# Patient Record
Sex: MALE | Race: WHITE | Employment: FULL TIME | URBAN - METROPOLITAN AREA
[De-identification: names, ages, dates, MRNs, and addresses within clinical notes are randomized per-mention and may not be internally consistent; named-entity substitution may affect disease eponyms.]

---

## 2024-02-24 ENCOUNTER — HOSPITAL ENCOUNTER (OUTPATIENT)
Facility: HOSPITAL | Age: 39
Setting detail: OBSERVATION
Discharge: HOME | End: 2024-02-25
Attending: EMERGENCY MEDICINE | Admitting: INTERNAL MEDICINE

## 2024-02-24 DIAGNOSIS — S01.511A LIP LACERATION: Primary | ICD-10-CM

## 2024-02-24 DIAGNOSIS — S01.511A LIP LACERATION, INITIAL ENCOUNTER: Primary | ICD-10-CM

## 2024-02-24 LAB
ANION GAP SERPL CALC-SCNC: 7 MMOL/L (ref 0–18)
APTT PPP: 30.8 SECONDS (ref 23.3–35.6)
BASOPHILS # BLD AUTO: 0.06 X10(3) UL (ref 0–0.2)
BASOPHILS NFR BLD AUTO: 0.6 %
BUN BLD-MCNC: 16 MG/DL (ref 9–23)
BUN/CREAT SERPL: 15.7 (ref 10–20)
CALCIUM BLD-MCNC: 9.4 MG/DL (ref 8.7–10.4)
CHLORIDE SERPL-SCNC: 106 MMOL/L (ref 98–112)
CO2 SERPL-SCNC: 27 MMOL/L (ref 21–32)
CREAT BLD-MCNC: 1.02 MG/DL
DEPRECATED RDW RBC AUTO: 43.5 FL (ref 35.1–46.3)
EGFRCR SERPLBLD CKD-EPI 2021: 96 ML/MIN/1.73M2 (ref 60–?)
EOSINOPHIL # BLD AUTO: 0.3 X10(3) UL (ref 0–0.7)
EOSINOPHIL NFR BLD AUTO: 3 %
ERYTHROCYTE [DISTWIDTH] IN BLOOD BY AUTOMATED COUNT: 12.9 % (ref 11–15)
GLUCOSE BLD-MCNC: 97 MG/DL (ref 70–99)
HCT VFR BLD AUTO: 47.4 %
HGB BLD-MCNC: 16 G/DL
IMM GRANULOCYTES # BLD AUTO: 0.05 X10(3) UL (ref 0–1)
IMM GRANULOCYTES NFR BLD: 0.5 %
INR BLD: 0.93 (ref 0.8–1.2)
LYMPHOCYTES # BLD AUTO: 2.7 X10(3) UL (ref 1–4)
LYMPHOCYTES NFR BLD AUTO: 27.1 %
MCH RBC QN AUTO: 30.7 PG (ref 26–34)
MCHC RBC AUTO-ENTMCNC: 33.8 G/DL (ref 31–37)
MCV RBC AUTO: 91 FL
MONOCYTES # BLD AUTO: 0.71 X10(3) UL (ref 0.1–1)
MONOCYTES NFR BLD AUTO: 7.1 %
NEUTROPHILS # BLD AUTO: 6.13 X10 (3) UL (ref 1.5–7.7)
NEUTROPHILS # BLD AUTO: 6.13 X10(3) UL (ref 1.5–7.7)
NEUTROPHILS NFR BLD AUTO: 61.7 %
OSMOLALITY SERPL CALC.SUM OF ELEC: 291 MOSM/KG (ref 275–295)
PLATELET # BLD AUTO: 249 10(3)UL (ref 150–450)
POTASSIUM SERPL-SCNC: 3.7 MMOL/L (ref 3.5–5.1)
PROTHROMBIN TIME: 13 SECONDS (ref 11.6–14.8)
RBC # BLD AUTO: 5.21 X10(6)UL
SODIUM SERPL-SCNC: 140 MMOL/L (ref 136–145)
WBC # BLD AUTO: 10 X10(3) UL (ref 4–11)

## 2024-02-24 PROCEDURE — 83036 HEMOGLOBIN GLYCOSYLATED A1C: CPT | Performed by: INTERNAL MEDICINE

## 2024-02-24 PROCEDURE — 80048 BASIC METABOLIC PNL TOTAL CA: CPT | Performed by: EMERGENCY MEDICINE

## 2024-02-24 PROCEDURE — 99285 EMERGENCY DEPT VISIT HI MDM: CPT

## 2024-02-24 PROCEDURE — 90471 IMMUNIZATION ADMIN: CPT

## 2024-02-24 PROCEDURE — 85025 COMPLETE CBC W/AUTO DIFF WBC: CPT | Performed by: EMERGENCY MEDICINE

## 2024-02-24 PROCEDURE — 96375 TX/PRO/DX INJ NEW DRUG ADDON: CPT

## 2024-02-24 PROCEDURE — 85610 PROTHROMBIN TIME: CPT | Performed by: EMERGENCY MEDICINE

## 2024-02-24 PROCEDURE — 96374 THER/PROPH/DIAG INJ IV PUSH: CPT

## 2024-02-24 PROCEDURE — 85730 THROMBOPLASTIN TIME PARTIAL: CPT | Performed by: EMERGENCY MEDICINE

## 2024-02-24 RX ORDER — HYDROCODONE BITARTRATE AND ACETAMINOPHEN 5; 325 MG/1; MG/1
2 TABLET ORAL ONCE
Status: COMPLETED | OUTPATIENT
Start: 2024-02-24 | End: 2024-02-24

## 2024-02-24 RX ORDER — HYDROMORPHONE HYDROCHLORIDE 1 MG/ML
0.2 INJECTION, SOLUTION INTRAMUSCULAR; INTRAVENOUS; SUBCUTANEOUS EVERY 2 HOUR PRN
Status: DISCONTINUED | OUTPATIENT
Start: 2024-02-24 | End: 2024-02-25

## 2024-02-24 RX ORDER — HYDROMORPHONE HYDROCHLORIDE 1 MG/ML
0.8 INJECTION, SOLUTION INTRAMUSCULAR; INTRAVENOUS; SUBCUTANEOUS EVERY 2 HOUR PRN
Status: DISCONTINUED | OUTPATIENT
Start: 2024-02-24 | End: 2024-02-25

## 2024-02-24 RX ORDER — HYDROMORPHONE HYDROCHLORIDE 1 MG/ML
0.4 INJECTION, SOLUTION INTRAMUSCULAR; INTRAVENOUS; SUBCUTANEOUS EVERY 2 HOUR PRN
Status: DISCONTINUED | OUTPATIENT
Start: 2024-02-24 | End: 2024-02-25

## 2024-02-24 RX ORDER — CEFAZOLIN SODIUM/WATER 2 G/20 ML
2 SYRINGE (ML) INTRAVENOUS ONCE
Status: COMPLETED | OUTPATIENT
Start: 2024-02-24 | End: 2024-02-24

## 2024-02-24 RX ORDER — DEXTROSE AND SODIUM CHLORIDE 5; .45 G/100ML; G/100ML
INJECTION, SOLUTION INTRAVENOUS CONTINUOUS
Status: DISCONTINUED | OUTPATIENT
Start: 2024-02-24 | End: 2024-02-25

## 2024-02-24 RX ORDER — KETOROLAC TROMETHAMINE 15 MG/ML
15 INJECTION, SOLUTION INTRAMUSCULAR; INTRAVENOUS EVERY 6 HOURS PRN
Status: DISCONTINUED | OUTPATIENT
Start: 2024-02-24 | End: 2024-02-25

## 2024-02-24 RX ORDER — MORPHINE SULFATE 4 MG/ML
4 INJECTION, SOLUTION INTRAMUSCULAR; INTRAVENOUS ONCE
Status: COMPLETED | OUTPATIENT
Start: 2024-02-24 | End: 2024-02-24

## 2024-02-24 NOTE — ED INITIAL ASSESSMENT (HPI)
Patient presents to ER with extensive laceration to lip.   Patient admits to being hit in the mouth with a grinding wheel while at work.   Lip is split in middle with other lacerations noted.   No obvious dental injury.   Bleeding controlled in triage.

## 2024-02-25 ENCOUNTER — ANESTHESIA (OUTPATIENT)
Dept: SURGERY | Facility: HOSPITAL | Age: 39
End: 2024-02-25

## 2024-02-25 ENCOUNTER — ANESTHESIA EVENT (OUTPATIENT)
Dept: SURGERY | Facility: HOSPITAL | Age: 39
End: 2024-02-25

## 2024-02-25 VITALS
DIASTOLIC BLOOD PRESSURE: 79 MMHG | SYSTOLIC BLOOD PRESSURE: 124 MMHG | TEMPERATURE: 98 F | HEIGHT: 68 IN | OXYGEN SATURATION: 97 % | HEART RATE: 88 BPM | RESPIRATION RATE: 14 BRPM | WEIGHT: 200 LBS | BODY MASS INDEX: 30.31 KG/M2

## 2024-02-25 PROBLEM — S01.511A LIP LACERATION, INITIAL ENCOUNTER: Status: RESOLVED | Noted: 2024-02-24 | Resolved: 2024-02-25

## 2024-02-25 LAB
EST. AVERAGE GLUCOSE BLD GHB EST-MCNC: 111 MG/DL (ref 68–126)
HBA1C MFR BLD: 5.5 % (ref ?–5.7)

## 2024-02-25 PROCEDURE — 0HQ1XZZ REPAIR FACE SKIN, EXTERNAL APPROACH: ICD-10-PCS | Performed by: SURGERY

## 2024-02-25 PROCEDURE — 0CX1XZZ TRANSFER LOWER LIP, EXTERNAL APPROACH: ICD-10-PCS | Performed by: SURGERY

## 2024-02-25 RX ORDER — OXYCODONE HYDROCHLORIDE AND ACETAMINOPHEN 5; 325 MG/1; MG/1
1-2 TABLET ORAL EVERY 4 HOURS PRN
Qty: 30 TABLET | Refills: 0 | Status: SHIPPED | OUTPATIENT
Start: 2024-02-25

## 2024-02-25 RX ORDER — BACITRACIN ZINC AND POLYMYXIN B SULFATE 500; 10000 [USP'U]/G; [USP'U]/G
OINTMENT TOPICAL AS NEEDED
Status: DISCONTINUED | OUTPATIENT
Start: 2024-02-25 | End: 2024-02-25 | Stop reason: HOSPADM

## 2024-02-25 RX ORDER — ONDANSETRON 4 MG/1
4 TABLET, FILM COATED ORAL EVERY 8 HOURS PRN
Qty: 10 TABLET | Refills: 0 | Status: SHIPPED | OUTPATIENT
Start: 2024-02-25

## 2024-02-25 RX ORDER — OXYCODONE HYDROCHLORIDE AND ACETAMINOPHEN 5; 325 MG/1; MG/1
1 TABLET ORAL EVERY 4 HOURS PRN
Status: DISCONTINUED | OUTPATIENT
Start: 2024-02-25 | End: 2024-02-25

## 2024-02-25 RX ORDER — MORPHINE SULFATE 4 MG/ML
2 INJECTION, SOLUTION INTRAMUSCULAR; INTRAVENOUS EVERY 10 MIN PRN
Status: DISCONTINUED | OUTPATIENT
Start: 2024-02-25 | End: 2024-02-25 | Stop reason: HOSPADM

## 2024-02-25 RX ORDER — DEXAMETHASONE SODIUM PHOSPHATE 4 MG/ML
VIAL (ML) INJECTION AS NEEDED
Status: DISCONTINUED | OUTPATIENT
Start: 2024-02-25 | End: 2024-02-25 | Stop reason: SURG

## 2024-02-25 RX ORDER — SODIUM CHLORIDE, SODIUM LACTATE, POTASSIUM CHLORIDE, CALCIUM CHLORIDE 600; 310; 30; 20 MG/100ML; MG/100ML; MG/100ML; MG/100ML
INJECTION, SOLUTION INTRAVENOUS CONTINUOUS PRN
Status: DISCONTINUED | OUTPATIENT
Start: 2024-02-25 | End: 2024-02-25 | Stop reason: SURG

## 2024-02-25 RX ORDER — CEFAZOLIN SODIUM/WATER 2 G/20 ML
SYRINGE (ML) INTRAVENOUS AS NEEDED
Status: DISCONTINUED | OUTPATIENT
Start: 2024-02-25 | End: 2024-02-25 | Stop reason: SURG

## 2024-02-25 RX ORDER — ONDANSETRON 2 MG/ML
INJECTION INTRAMUSCULAR; INTRAVENOUS AS NEEDED
Status: DISCONTINUED | OUTPATIENT
Start: 2024-02-25 | End: 2024-02-25 | Stop reason: SURG

## 2024-02-25 RX ORDER — AMOXICILLIN AND CLAVULANATE POTASSIUM 875; 125 MG/1; MG/1
1 TABLET, FILM COATED ORAL 2 TIMES DAILY
Qty: 20 TABLET | Refills: 0 | Status: SHIPPED | OUTPATIENT
Start: 2024-02-25 | End: 2024-03-06

## 2024-02-25 RX ORDER — HYDROMORPHONE HYDROCHLORIDE 1 MG/ML
0.4 INJECTION, SOLUTION INTRAMUSCULAR; INTRAVENOUS; SUBCUTANEOUS EVERY 5 MIN PRN
Status: DISCONTINUED | OUTPATIENT
Start: 2024-02-25 | End: 2024-02-25 | Stop reason: HOSPADM

## 2024-02-25 RX ORDER — ROCURONIUM BROMIDE 10 MG/ML
INJECTION, SOLUTION INTRAVENOUS AS NEEDED
Status: DISCONTINUED | OUTPATIENT
Start: 2024-02-25 | End: 2024-02-25 | Stop reason: SURG

## 2024-02-25 RX ORDER — HYDROMORPHONE HYDROCHLORIDE 1 MG/ML
0.2 INJECTION, SOLUTION INTRAMUSCULAR; INTRAVENOUS; SUBCUTANEOUS EVERY 5 MIN PRN
Status: DISCONTINUED | OUTPATIENT
Start: 2024-02-25 | End: 2024-02-25 | Stop reason: HOSPADM

## 2024-02-25 RX ORDER — LIDOCAINE HYDROCHLORIDE 10 MG/ML
INJECTION, SOLUTION EPIDURAL; INFILTRATION; INTRACAUDAL; PERINEURAL AS NEEDED
Status: DISCONTINUED | OUTPATIENT
Start: 2024-02-25 | End: 2024-02-25 | Stop reason: SURG

## 2024-02-25 RX ORDER — NALOXONE HYDROCHLORIDE 0.4 MG/ML
0.08 INJECTION, SOLUTION INTRAMUSCULAR; INTRAVENOUS; SUBCUTANEOUS AS NEEDED
Status: DISCONTINUED | OUTPATIENT
Start: 2024-02-25 | End: 2024-02-25 | Stop reason: HOSPADM

## 2024-02-25 RX ORDER — ONDANSETRON 2 MG/ML
INJECTION INTRAMUSCULAR; INTRAVENOUS
Status: COMPLETED
Start: 2024-02-25 | End: 2024-02-25

## 2024-02-25 RX ORDER — SODIUM CHLORIDE, SODIUM LACTATE, POTASSIUM CHLORIDE, CALCIUM CHLORIDE 600; 310; 30; 20 MG/100ML; MG/100ML; MG/100ML; MG/100ML
INJECTION, SOLUTION INTRAVENOUS CONTINUOUS
Status: DISCONTINUED | OUTPATIENT
Start: 2024-02-25 | End: 2024-02-25 | Stop reason: HOSPADM

## 2024-02-25 RX ORDER — DOCUSATE SODIUM 100 MG/1
100 CAPSULE, LIQUID FILLED ORAL 2 TIMES DAILY
Qty: 30 CAPSULE | Refills: 0 | Status: SHIPPED | OUTPATIENT
Start: 2024-02-25

## 2024-02-25 RX ORDER — CHLORHEXIDINE GLUCONATE ORAL RINSE 1.2 MG/ML
15 SOLUTION DENTAL ONCE
Status: DISCONTINUED | OUTPATIENT
Start: 2024-02-25 | End: 2024-02-25 | Stop reason: HOSPADM

## 2024-02-25 RX ORDER — MIDAZOLAM HYDROCHLORIDE 1 MG/ML
INJECTION INTRAMUSCULAR; INTRAVENOUS AS NEEDED
Status: DISCONTINUED | OUTPATIENT
Start: 2024-02-25 | End: 2024-02-25 | Stop reason: SURG

## 2024-02-25 RX ORDER — LIDOCAINE HYDROCHLORIDE 10 MG/ML
INJECTION, SOLUTION EPIDURAL; INFILTRATION; INTRACAUDAL; PERINEURAL AS NEEDED
Status: DISCONTINUED | OUTPATIENT
Start: 2024-02-25 | End: 2024-02-25 | Stop reason: HOSPADM

## 2024-02-25 RX ORDER — MORPHINE SULFATE 10 MG/ML
6 INJECTION, SOLUTION INTRAMUSCULAR; INTRAVENOUS EVERY 10 MIN PRN
Status: DISCONTINUED | OUTPATIENT
Start: 2024-02-25 | End: 2024-02-25 | Stop reason: HOSPADM

## 2024-02-25 RX ORDER — GLYCOPYRROLATE 0.2 MG/ML
INJECTION, SOLUTION INTRAMUSCULAR; INTRAVENOUS AS NEEDED
Status: DISCONTINUED | OUTPATIENT
Start: 2024-02-25 | End: 2024-02-25 | Stop reason: SURG

## 2024-02-25 RX ORDER — MORPHINE SULFATE 4 MG/ML
4 INJECTION, SOLUTION INTRAMUSCULAR; INTRAVENOUS EVERY 10 MIN PRN
Status: DISCONTINUED | OUTPATIENT
Start: 2024-02-25 | End: 2024-02-25 | Stop reason: HOSPADM

## 2024-02-25 RX ORDER — ONDANSETRON 2 MG/ML
4 INJECTION INTRAMUSCULAR; INTRAVENOUS EVERY 6 HOURS PRN
Status: DISCONTINUED | OUTPATIENT
Start: 2024-02-25 | End: 2024-02-25

## 2024-02-25 RX ORDER — HYDROMORPHONE HYDROCHLORIDE 1 MG/ML
0.6 INJECTION, SOLUTION INTRAMUSCULAR; INTRAVENOUS; SUBCUTANEOUS EVERY 5 MIN PRN
Status: DISCONTINUED | OUTPATIENT
Start: 2024-02-25 | End: 2024-02-25 | Stop reason: HOSPADM

## 2024-02-25 RX ADMIN — MIDAZOLAM HYDROCHLORIDE 2 MG: 1 INJECTION INTRAMUSCULAR; INTRAVENOUS at 07:40:00

## 2024-02-25 RX ADMIN — ONDANSETRON 4 MG: 2 INJECTION INTRAMUSCULAR; INTRAVENOUS at 07:40:00

## 2024-02-25 RX ADMIN — ROCURONIUM BROMIDE 5 MG: 10 INJECTION, SOLUTION INTRAVENOUS at 07:40:00

## 2024-02-25 RX ADMIN — CEFAZOLIN SODIUM/WATER 2 G: 2 G/20 ML SYRINGE (ML) INTRAVENOUS at 07:33:00

## 2024-02-25 RX ADMIN — SODIUM CHLORIDE, SODIUM LACTATE, POTASSIUM CHLORIDE, CALCIUM CHLORIDE: 600; 310; 30; 20 INJECTION, SOLUTION INTRAVENOUS at 09:31:00

## 2024-02-25 RX ADMIN — GLYCOPYRROLATE 0.2 MG: 0.2 INJECTION, SOLUTION INTRAMUSCULAR; INTRAVENOUS at 07:40:00

## 2024-02-25 RX ADMIN — DEXAMETHASONE SODIUM PHOSPHATE 4 MG: 4 MG/ML VIAL (ML) INJECTION at 07:40:00

## 2024-02-25 RX ADMIN — SODIUM CHLORIDE, SODIUM LACTATE, POTASSIUM CHLORIDE, CALCIUM CHLORIDE: 600; 310; 30; 20 INJECTION, SOLUTION INTRAVENOUS at 07:35:00

## 2024-02-25 RX ADMIN — LIDOCAINE HYDROCHLORIDE 25 MG: 10 INJECTION, SOLUTION EPIDURAL; INFILTRATION; INTRACAUDAL; PERINEURAL at 07:40:00

## 2024-02-25 NOTE — INTERVAL H&P NOTE
Estabilshed Patient Consultation    Chief Complaint: complex lip laceration/ avulsion    History of Present Illness:   NORMAN Butler is a 38 year old male who sustained a complex work related lower lip injury. He was admitted for pain control and preparation for the OR.       Past Medical History:      History reviewed. No pertinent past medical history.      Past Surgical History:  History reviewed. No pertinent surgical history.      Medications:    See MAR      Allergies:    No Known Allergies      Family History:   No family history on file.      Social History:  History   Alcohol Use    Yes     Comment: occ       History   Smoking Status    Never   Smokeless Tobacco    Never       History   Drug Use    Types: Cannabis           Review of Systems:    The patient reports see HPI  All other systems are unremarkable.      Physical Exam:    /69 (BP Location: Left arm)   Pulse 72   Temp 98.6 °F (37 °C) (Temporal)   Resp 16   Ht 1.727 m (5' 8\")   Wt 90.7 kg (200 lb)   SpO2 95%   BMI 30.41 kg/m²     Constitutional: The patient is an alert, oriented and well-developed.     Neurologic: Speech patterns and movements are normal.     Psychiatric: Affect is appropriate.    Eyes: Conjunctiva are clear, non-icteric. PERRL    ENT: no obvious abnormality, no ear drainage, mucous membranes moist and pink    Integument/Skin: complex serrated lower lip laceration with destruction of vermillian border.       Respiratory: Normal respiratory effort.     Cardiovascular: no cyanosis, no edema    Musculoskeletal: Extremities unremarkable, without edema, tenderness or deformities        Impression:   NORMAN Butler  is a 38 year old with complex serrated lower lip laceration    Discussion and Plan:  The patient was counseled on the different treatment options.     In OR repair will give him the best outcome and therefore was scheduled for first availability of the OR this morning.     The different treatment  options were discussed with the patient.  The procedures and the postoperative care was discussed. Potential risks complications benefits and alternatives were discussed.  Risks and complications including but not limited to infection, bleeding, scarring, damage to surrounding structures, wound dehiscences, delayed wound healing, need for further surgery.    Patient understands and wishes to proceed with the procedure, questions were answered.      Khalida Giles MD  2/25/2024  7:31 AM

## 2024-02-25 NOTE — ED QUICK NOTES
This RN received bedside report from Opal MCWILLIAMS RN.    Rounding Completed    Plan of Care reviewed. Waiting for admission.  Elimination needs assessed.  Patient resting in bed, speaking in full sentences. Pt on cardiac monitor, for frequent VS assessments, NSR. No new requests at this time.  Respiratory effort good, even and unlabored. IV flushes and draws with ease    Bed is locked and in lowest position. Call light within reach.

## 2024-02-25 NOTE — DISCHARGE INSTRUCTIONS
Plastic Surgery Home Care Instructions      We hope you were pleased with your care at [facility].  We wish you the best outcome and overall experience with your operation.  These instructions will help to minimize pain, optimize healing, and improve the likelihood of a successful result.    What To Expect  There will be some spotting of the incision lines for the next few days.  Your lip will be swollen and might feel congested for the next 1-2 weeks  Temporary areas of numbness are typical in the early weeks Normalization of sensation can typically take up to several months following the injury  Mild bruising, mild swelling and discomfort is expected  Please DO NOT apply heat or ice to the affected area, ice packs with cloth on and on and off in surrounding area if needed no longer than 5 min at a time  You will have some pain during the recovery period, this is normal and will eventually go away    Bandages (Dressing)    None  Antibiotic ointment at least 3 times a day    Bathing/Showers  You can resume showering tomorrow.   Keep direct water pressure away from incision    Mouth care: do rinse with water after each meal and also mouthwash BID for 1 week  Do not bite into large food items cut them and put them in the back of your mouth  Do not smoke or drink with a straw      Pain Medication: percocet  Take one or two tablets every six hours as needed for severe pain.  Do not take narcotics, if you do not have pain. You can take Tylenol if you are not taking percocet  DO NOT take both Tylenol and percocet together as there is already Tylenol in the percocet  Keep stools soft.  Take a stool softener (Metamucil, Milk of Magnesia, Colace).      Antibiotics: Augmentin as prescribed  Antibiotics will help minimize the risk of a wound infection      Over-The-Counter Medication  Non-prescription anti-inflammatory medications can also help to ease the pain.  You can take Aleve or ibuprofen   Take as directed  on the bottle  Drink a full glass of water with the medication and not on empty stomach    Home Medication  Resume your home medications as instructed      Diet  Resume your normal diet    Activity  No strenuous activity or heavy lifting (no lifting over 10 pounds)  No kristin or dirty work environment, no overhead or head down activities until seen in the office   No heavy workouts     Driving  Do not drive, if you are taking pain medication.    Return to Work or School  We will re-evaluate at follow up and dependent on what work you will be doing exactly.     Follow-up Appointment   Our office will call you to set up a time      NORMAN     Thank you for coming to [facility] for your operation.  The nurses and the anesthesiologist try very hard to make sure you receive the best care possible.  Your trust in them is greatly appreciated.      Thanks so much,  Dr. Tisha Cramer, PA-C  Azul Ramon, JUSTINAP-C  MICHELLE Mendoza

## 2024-02-25 NOTE — H&P
[facility]    IM History & Physical    NORMAN Butler Patient Status:  Observation    9/15/1985 MRN [id number]   Location [facility] 4W/SW/SE Attending Bhavesh Macdonald MD   Hosp Day # 0 PCP Lorenzo Arnold MD     Date of Admission:  2024  Date of Service:  2024    Reason for Admission:   Lip and face laceration    History of Present Illness:   Patient is a 38 year old male who was admitted to the hospital for Lip laceration, initial encounter:  Patient works in [employer] at a [employer].  Patient otherwise in good health. He accidentally sustained a laceration to huis upper and lower lip when the tool broke while he was fabricating a metal part.  No LOC no loss of teeth and no other penetrating trauma.  No Ocular injury.    Past Medical History  History reviewed. No pertinent past medical history.    Past Surgical History  History reviewed. No pertinent surgical history.    Family History  No family history on file.    Social History  Pediatric History   Patient Parents    Not on file     Other Topics Concern    Not on file   Social History Narrative    Not on file           Current Medications:    Current Facility-Administered Medications:     oxyCODONE-acetaminophen (Percocet) 5-325 MG per tab 1 tablet, 1 tablet, Oral, Q4H PRN    HYDROmorphone (Dilaudid) 1 MG/ML injection 0.2 mg, 0.2 mg, Intravenous, Q2H PRN **OR** HYDROmorphone (Dilaudid) 1 MG/ML injection 0.4 mg, 0.4 mg, Intravenous, Q2H PRN **OR** HYDROmorphone (Dilaudid) 1 MG/ML injection 0.8 mg, 0.8 mg, Intravenous, Q2H PRN    ketorolac (Toradol) 15 MG/ML injection 15 mg, 15 mg, Intravenous, Q6H PRN    pantoprazole (Protonix) 40 mg in sodium chloride 0.9% PF 10 mL IV push, 40 mg, Intravenous, Daily    dextrose 5%-sodium chloride 0.45% infusion, , Intravenous, Continuous  No medications prior to admission.       Allergies  No Known Allergies    Review of Systems:     A comprehensive review of systems was negative except as per HPI    Physical Exam:     Vitals:    02/25/24 1047   BP: 124/79   Pulse: 88   Resp: 14   Temp:        Intake/Output Summary (Last 24 hours) at 2/25/2024 1227  Last data filed at 2/25/2024 1019  Gross per 24 hour   Intake 620 ml   Output 0 ml   Net 620 ml     Wt Readings from Last 2 Encounters:   02/24/24 200 lb (90.7 kg)       General appearance:  alert, appears stated age, and cooperative  Head: Normocephalic, without obvious abnormality, extensive laceration of the left side face with both upper and lower lips involved.  Eyes: conjunctivae/corneas clear. PERRL, EOM's intact. Fundi benign.  Ears: normal TM's and external ear canals both ears  Nose: Nares normal. Septum midline. Mucosa normal. No drainage or sinus tenderness.  Throat:  Laceration lip  Neck: no adenopathy, no carotid bruit, no JVD, supple, symmetrical, trachea midline, and thyroid not enlarged, symmetric, no tenderness/mass/nodules  Pulmonary: clear to auscultation bilaterally  Cardiovascular: S1, S2 normal, no murmur, click, rub or gallop, regular rate and rhythm  Abdominal: soft, non-tender; bowel sounds normal; no masses,  no organomegaly  Extremities: extremities normal, atraumatic, no cyanosis or edema  Pulses: 2+ and symmetric  Skin: Skin color, texture, turgor normal. No rashes or lesions  Neurologic: Grossly normal  Genital Exam: defer exam    Results:     Laboratory Data:  Lab Results   Component Value Date    WBC 10.0 02/24/2024    HGB 16.0 02/24/2024    HCT 47.4 02/24/2024    .0 02/24/2024     02/24/2024    K 3.7 02/24/2024     02/24/2024    CO2 27.0 02/24/2024    CREATSERUM 1.02 02/24/2024    BUN 16 02/24/2024    GLU 97 02/24/2024    CA 9.4 02/24/2024    ALB 3.4 (L) 08/30/2011    ALKPHO 58 08/30/2011    BILT 0.3 08/30/2011    TP 6.2 08/30/2011     (H) 08/30/2011     (H) 08/30/2011    PTT 30.8 02/24/2024    INR 0.93 02/24/2024    PT 13.7  08/29/2011    VESTA 66 08/29/2011    ETOH 255 (H) 08/29/2011     No results found for this visit on 02/24/24.    Imaging:  No results found.       Impression:     Lip laceration, initial encounter  Patient seen by facial/ plastic surgery. Laceration repaired successfully. Return of lip sensation upon attempted touching with tongue.    DVT Prophylaxis: On SCD    GI Prophylaxis: on PPI.    Discharge once cleared per surgery. Abx per surgery.    Total time spent seeing patient was 45 minutes.        Recommendations:  As above.    Thank you for allowing me to participate in the care of your patient.    ZAID FERREIRA MD  2/25/2024

## 2024-02-25 NOTE — ED PROVIDER NOTES
Patient Seen in: [facility] Emergency Department      History     Chief Complaint   Patient presents with    Laceration/Abrasion     Stated Complaint: Lip injury    Subjective:   HPI  38-year-old male without ongoing medical issues presenting for evaluation of lip injury.  About an hour prior to arrival in the ED he was at work and using a grinding wheel.  The machine malfunctioned and the grinding wheel hit his lower lip.  No LOC or other injuries.  No blood thinners.      Objective:   History reviewed. No pertinent past medical history.           History reviewed. No pertinent surgical history.             Social History     Socioeconomic History    Marital status: Single   Tobacco Use    Smoking status: Never    Smokeless tobacco: Never   Vaping Use    Vaping Use: Never used   Substance and Sexual Activity    Alcohol use: Yes     Comment: occ    Drug use: Yes     Types: Cannabis              Review of Systems    Positive for stated complaint: Lip injury  Other systems are as noted in HPI.  Constitutional and vital signs reviewed.      All other systems reviewed and negative except as noted above.    Physical Exam     ED Triage Vitals [02/24/24 1739]   BP (!) 155/99   Pulse 68   Resp 18   Temp 98.8 °F (37.1 °C)   Temp src    SpO2 96 %   O2 Device None (Room air)       Current:/75   Pulse 69   Temp 98.8 °F (37.1 °C)   Resp 18   Ht 172.7 cm (5' 8\")   Wt 90.7 kg   SpO2 92%   BMI 30.41 kg/m²         Physical Exam  Vitals and nursing note reviewed.   Constitutional:       Appearance: He is well-developed.   HENT:      Head: Normocephalic.      Mouth/Throat:      Mouth: Mucous membranes are moist. No injury or oral lesions.      Dentition: Normal dentition. No gingival swelling.      Tongue: No lesions.      Palate: No lesions.      Pharynx: Oropharynx is clear. Uvula midline.        Comments: Complex lip lacerations as above involving a horizontal laceration at the lower lip vermilion border with  2 discrete flaps attached at the mucosal surface  Eyes:      Extraocular Movements: Extraocular movements intact.   Cardiovascular:      Rate and Rhythm: Normal rate and regular rhythm.      Heart sounds: Normal heart sounds.   Pulmonary:      Effort: Pulmonary effort is normal.      Breath sounds: Normal breath sounds.   Musculoskeletal:         General: Normal range of motion.      Cervical back: Normal range of motion and neck supple. No tenderness.   Skin:     General: Skin is warm.      Capillary Refill: Capillary refill takes less than 2 seconds.   Neurological:      Mental Status: He is alert.      Comments: No focal deficits               ED Course     Labs Reviewed   BASIC METABOLIC PANEL (8)   CBC WITH DIFFERENTIAL WITH PLATELET    Narrative:     The following orders were created for panel order CBC With Differential With Platelet.  Procedure                               Abnormality         Status                     ---------                               -----------         ------                     CBC W/ DIFFERENTIAL[[id number]]                              In process                   Please view results for these tests on the individual orders.   PTT, ACTIVATED   PROTHROMBIN TIME (PT)   CBC W/ DIFFERENTIAL                      [facility]        Admission disposition: 2/24/2024  7:05 PM                  Medical Decision Making  Patient well-appearing and ambulatory, bleeding well-controlled.  Wound was copiously irrigated and there were no foreign bodies visualized.  Tdap updated today.  IV Ancef administered.  I spoke with Dr. Giles, on-call for plastic surgery, who plan for operative repair in the OR at 0730 and recommends n.p.o. at midnight, dressing with bacitracin and nonadhesive gauze.  Discussed with Dr. Macdonald for admission request coags be sent.    Problems Addressed:  Lip laceration, initial encounter: complicated acute illness or injury    Amount and/or Complexity of Data Reviewed  Labs:  ordered.  Discussion of management or test interpretation with external provider(s): As above    Risk  Parenteral controlled substances.  Decision regarding hospitalization.        Disposition and Plan     Clinical Impression:  1. Lip laceration, initial encounter         Disposition:  Admit  2/24/2024  7:05 pm    Follow-up:  No follow-up provider specified.        Medications Prescribed:  There are no discharge medications for this patient.                        Hospital Problems       Present on Admission             ICD-10-CM Noted POA    * (Principal) Lip laceration, initial encounter S01.511A 2/24/2024 Unknown

## 2024-02-25 NOTE — H&P (VIEW-ONLY)
I was called by the ER attending regarding this patient who sustained a work-related traumatic lower lip injury.  Pictures were reevaluated.  He has a very complex laceration extending through the vermilion border and this requires repair in the operating room.  I spoke with the operating room and they have a 730 start available tomorrow morning.  There is no team in house at this point.  The patient will be admitted for antibiotics and  hydration by the hospitalist and I will take him to the operating room tomorrow morning.

## 2024-02-25 NOTE — ANESTHESIA PROCEDURE NOTES
Airway  Date/Time: 2/25/2024 7:41 AM  Urgency: elective    Airway not difficult    General Information and Staff    Patient location during procedure: OR  Anesthesiologist: Nick Castillo MD  Performed: anesthesiologist   Performed by: Nick Castillo MD  Authorized by: Nick Castillo MD      Indications and Patient Condition  Indications for airway management: anesthesia  Spontaneous Ventilation: absent  Sedation level: deep  Preoxygenated: yes  Patient position: sniffing  Mask difficulty assessment: 1 - vent by mask  Planned trial extubation    Final Airway Details  Final airway type: endotracheal airway      Successful airway: ETT  Cuffed: yes   Successful intubation technique: direct laryngoscopy  Facilitating devices/methods: intubating stylet  Endotracheal tube insertion site: oral  Blade: Lorenzo  Blade size: #3  ETT size (mm): 7.5    Cormack-Lehane Classification: grade I - full view of glottis  Placement verified by: capnometry   Cuff volume (mL): 8  Measured from: teeth  ETT to teeth (cm): 21  Number of attempts at approach: 1  Number of other approaches attempted: 0

## 2024-02-25 NOTE — ANESTHESIA POSTPROCEDURE EVALUATION
Patient: NORMAN Butler    Procedure Summary       Date: 02/25/24 Room / Location: [facility] MAIN OR 04 / [facility] MAIN OR    Anesthesia Start: 0732 Anesthesia Stop:     Procedure: REPAIR OF LOWER LIP LACERATION Diagnosis:       Lip laceration      (Lip laceration [S01.511A])    Surgeons: Khalida Giles MD Anesthesiologist: Nick Crisostomo MD    Anesthesia Type: general ASA Status: 2            Anesthesia Type: No value filed.    Vitals Value Taken Time   /84 02/25/24 0930   Temp 97.3 °F (36.3 °C) 02/25/24 0930   Pulse 89 02/25/24 0931   Resp 11 02/25/24 0931   SpO2 93 % 02/25/24 0931   Vitals shown include unfiled device data.    [facility] AN Post Evaluation:   Patient Evaluated in PACU  Patient Participation: complete - patient participated  Level of Consciousness: awake  Pain Management: adequate  Airway Patency:patent  Dental exam unchanged from preop  Yes    Cardiovascular Status: acceptable  Respiratory Status: acceptable  Postoperative Hydration acceptable      NICK CRISOSTOMO MD  2/25/2024 9:32 AM
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
## 2024-02-25 NOTE — PLAN OF CARE
Hussein is alert and oriented x4. He is voiding freely. He has been npo overnight. Surgery planned for this morning with dr farfan to repair his lip. Pain is managed with ivp dilaudid and toradol prn. He is up self. Ivf are in place. Discharge plan to be determined.

## 2024-02-25 NOTE — PLAN OF CARE
Pt alert, voided after surgery, emesis and nausea controlled, discharge order in, instructions given, picked up by family  Abx ointment given.meds picked up from pharmacy  Problem: Patient Centered Care  Goal: Patient preferences are identified and integrated in the patient's plan of care  Description: Interventions:  - What would you like us to know as we care for you? I want to go back to work asap  - Provide timely, complete, and accurate information to patient/family  - Incorporate patient and family knowledge, values, beliefs, and cultural backgrounds into the planning and delivery of care  - Encourage patient/family to participate in care and decision-making at the level they choose  - Honor patient and family perspectives and choices  2/25/2024 1627 by Mechelle Steiner RN  Outcome: Progressing  2/25/2024 1613 by Mechelle Steiner RN  Outcome: Progressing     Problem: Patient/Family Goals  Goal: Patient/Family Long Term Goal  Description: Patient's Long Term Goal: healed face    Interventions:  - antibx ointment  - See additional Care Plan goals for specific interventions  2/25/2024 1627 by Mechelle Steiner RN  Outcome: Progressing  2/25/2024 1613 by Mechelle Steiner RN  Outcome: Progressing  Goal: Patient/Family Short Term Goal  Description: Patient's Short Term Goal: pee, go home    Interventions:   - fluids  - See additional Care Plan goals for specific interventions  2/25/2024 1627 by Mechelle Steiner RN  Outcome: Progressing  2/25/2024 1613 by Mechelle Steiner RN  Outcome: Progressing

## 2024-02-25 NOTE — BRIEF OP NOTE
Pre-Operative Diagnosis: Lip laceration [S01.511A]     Post-Operative Diagnosis: Lip laceration [S01.511A]      Procedure Performed:   REPAIR OF LOWER LIP LACERATION  With local tissue rearrangement and composite vermilion graft 0.5cm2  Repair of chin laceration 1.2cm and upper cutaneous lip 0.8cm     Surgeon(s) and Role:     * Khalida Giles MD - Primary    Assistant(s):  Surgical Assistant.: Joshua Benjamin     Surgical Findings: see dictation     Specimen: none     Estimated Blood Loss: No data recorded    Dictation Number:      Khalida Giles MD  2/25/2024  9:38 AM

## 2024-02-26 NOTE — OPERATIVE REPORT
[facility]    PATIENT'S NAME: NORMAN ABEL   ATTENDING PHYSICIAN: Bhavesh Macdonald MD   OPERATING PHYSICIAN: Khalida Giles MD   PATIENT ACCOUNT#:   [id number]    LOCATION:  [address] [place] [address]  MEDICAL RECORD #:   [id number]       YOB: 1985  ADMISSION DATE:       02/24/2024      OPERATION DATE:  02/25/2024    OPERATIVE REPORT    PREOPERATIVE DIAGNOSIS:  Complex lower lip laceration, partial avulsion, skin laceration, cutaneous lip laceration.  POSTOPERATIVE DIAGNOSIS:  Complex lower lip laceration, partial avulsion, skin laceration, cutaneous lip laceration.  PROCEDURE:  Laceration repair, chin 1.2 cm, upper cutaneous lip 0.8 cm, full-thickness lower lip reconstruction with local tissue rearrangement (6 sq cm, composite vermillion border graft 0.5 sq cm).    ASSISTANT:  DORIS Colbert.      ANESTHESIA:  General endotracheal anesthesia.    COMPLICATIONS:  None.    BLOOD LOSS:  Minimal.    INDICATIONS:  This is a 38-year-old gentleman who at work sustained a traumatic injury to his lower lip and face.  He reports a circulating saw injuring his lower lip, causing a severe complex lower lip laceration as well as additional laceration on his chin and upper cutaneous lip.  He was seen in the ER and Plastic Surgery was consulted.  The patient had eaten a few hours ago, and I discussed with the OR team their availability and the best timing; therefore, the patient was admitted and the procedure is scheduled for this morning.  In the meantime, wound had been washed out.  The patient received antibiotics and IV fluid as well as pain control.  I saw the patient and discussed with him the plan for surgical repair as well as potential risks, complications, benefits, and alternatives.  The patient wished to proceed.  Questions were answered.    OPERATIVE TECHNIQUE:  The patient was identified in the holding area and informed consent was obtained. The patient was then brought back to the  operating room, placed in supine position, and he was adequately padded and sequential compression devices were applied.  General endotracheal anesthesia was administered.  Preop antibiotics were given.  Then, entire face and lip were prepped and draped in usual sterile fashion.  The lip was irrigated with saline solution, and the other lacerations were irrigated with saline solution as well.  Then, the chin laceration was repaired using 5-0 interrupted Prolene sutures and the upper cutaneous lip laceration was repaired as well with 5-0 interrupted Prolene sutures.  The chin laceration was linear and the upper cutaneous lip laceration had a V-shape.  Attention was turned toward the lip.  Lidocaine 1% with epinephrine was given surrounding the laceration and then the full-thickness repair was started with preparing and approximating the orbicularis musculature with 4-0 Vicryl sutures. Most of it was intact.  This was reinforced with sutures.  Then, the complex layered closure of parts of it was performed where tissue was intact.  This was done with 5-0 and 4-0 Vicryl sutures for the deep layer and 4-0 and 5-0 chromic sutures for the skin.  On the left lower lip vermillion border, there was lower lip partially avulsed and part of the vermillion border tissue appeared to be missing.  Therefore, in order to reconstruct his lower lip as anatomically appropriate as possible, a combination of 2 things were done.  Number one, a local tissue rearrangement was performed.  A 15-blade was used to make an additional incision at the extension into the chin lower cutaneous lip area.  This allowed for rotation of the edge of the vermillion border into the area of the partially avulsed lip tissue.  The partially avulsed lip tissue was then inset in that flap area with 4-0 Vicryl sutures.  There was a small portion of vermillion border, half area of vermillion border missing at the inset of the avulsed lip and, therefore, a small  skin and dermal graft with a hair follicle was taken from the excess skin of the rotation advancement flap dog-ear of the skin.  This was measuring 1 x 5 mm in size.  This was then secured at the lower edge of vermillion border lip flap inset with 5-0 chromic sutures.  Then, the remaining inset of the lip flap was done using again a combination of 4-0 Vicryl suture, 4-0 chromic sutures, and 5-0 chromic sutures.  After the alignment of the lip, bacitracin ointment was applied.  The patient awoke from anesthesia.  All sponge, instrument, and needle counts were correct at the end of the case.    Dictated By Khalida Giles MD  d: 02/25/2024 10:23:37  t: 02/25/2024 17:14:22  [facility] [id number]  [facility] [place]/

## 2024-03-04 ENCOUNTER — TELEPHONE (OUTPATIENT)
Dept: SURGERY | Facility: CLINIC | Age: 39
End: 2024-03-04

## 2024-03-04 ENCOUNTER — OFFICE VISIT (OUTPATIENT)
Dept: SURGERY | Facility: CLINIC | Age: 39
End: 2024-03-04
Payer: OTHER MISCELLANEOUS

## 2024-03-04 DIAGNOSIS — S01.511D LIP LACERATION, SUBSEQUENT ENCOUNTER: Primary | ICD-10-CM

## 2024-03-04 PROBLEM — S01.511A LIP LACERATION: Status: ACTIVE | Noted: 2024-03-04

## 2024-03-04 NOTE — TELEPHONE ENCOUNTER
Called patient to confirm that he is still taking his abx.  Reminded patient that he needs to complete the full course of abx.  Patient verbalized understanding and was appreciative of the call.

## 2024-03-04 NOTE — PROGRESS NOTES
NORMAN Butler is a 38 year old male who presents today for a follow-up after laceration repair chin and upper cutaneous lip, full thickness lower lip reconstruction with local tissue rearrangement and composite vermillion border graft on 2/25/2024 with Dr. Giles. He denies fever and chills. He denies nausea, vomiting, diarrhea or constipation. His pain is controlled.     Physical Exam     HEENT: chin and upper cutaneous lip incisions with prolene sutures in place, clean, dry and intact without wound drainage or wound dehiscence. No erythema. No hematoma/seroma.   Lower lip incisions with vicryl and chromic sutures in place, clean, dry and intact without wound drainage or wound dehiscence. Alignment maintained. No erythema. No hematoma/seroma.     There were no vitals filed for this visit.      Assessment and Plan     NORMAN Butler is doing well s/p  laceration repair chin and upper cutaneous lip, full thickness lower lip reconstruction with local tissue rearrangement and composite vermillion border graft on 2/25/2024 with Dr. Giles.     Prolene sutures were removed from the upper cutaneous lip and chin. He tolerated this well. He should continue to apply antibiotic ointment at several times throughout the day to his lower lip and daily to the chin/upper cutaneous lip incisions. He will continue with his mouthwash regimen and avoiding large bites of food or opening his mouth wide. He should be cautious with hot/cold items. He should not smoke or use a straw. He should complete his antibiotic. We reviewed returning to work. He reports he is ready to return to work. He reports he wears a mask at all times and works in a clean work environment. He will return to work tomorrow and wear a mask at all times. He should be allowed frequent breaks to allow him to perform his wound care regimen. We reviewed reasons to contact our office. He will follow up in 3 weeks with Dr. Giles.     Questions were answered.  Patient understands.     COMPA Manriquez  3/4/2024  2:02 PM

## 2024-03-20 ENCOUNTER — OFFICE VISIT (OUTPATIENT)
Dept: SURGERY | Facility: CLINIC | Age: 39
End: 2024-03-20
Payer: OTHER MISCELLANEOUS

## 2024-03-20 DIAGNOSIS — S01.511D LIP LACERATION, SUBSEQUENT ENCOUNTER: Primary | ICD-10-CM

## 2024-03-20 PROCEDURE — 99024 POSTOP FOLLOW-UP VISIT: CPT | Performed by: SURGERY

## 2024-03-21 NOTE — PROGRESS NOTES
NORMAN Butler is a 38 year old male who presents today for a follow-up after laceration repair chin and upper cutaneous lip, full thickness lower lip reconstruction with local tissue rearrangement and composite vermillion border graft on 2/25/2024.He denies fever and chills. He denies nausea, vomiting, diarrhea or constipation. His pain is controlled.      Physical Exam     HEENT: chin and upper cutaneous lip incisions clean, dry and intact without wound drainage or wound dehiscence. No erythema. No hematoma/seroma.   Lower lip incisions with some vicryl and chromic sutures in place, clean, dry and intact without wound drainage or wound dehiscence. Alignment maintained. No erythema. No hematoma/seroma.     There were no vitals filed for this visit.      Assessment and Plan     NORMAN Butler is doing well s/p laceration repair chin and upper cutaneous lip, full thickness lower lip reconstruction with local tissue rearrangement and composite vermillion border graft on 2/25/2024    Remaining vicryl and chromic sutures were removed today. He will continue to apply antibiotic ointment throughout the day. We reviewed activity recommendations. We reviewed options for scar management, including scar massage. He will follow up in 6 months or sooner if he has any questions/concerns.     Questions were answered. Patient understands.

## (undated) DEVICE — Device

## (undated) DEVICE — SPONGE GZ W4XL4IN 100% COT 16 PLY HIGHLY

## (undated) DEVICE — GLOVE GAMMEX PI HYBRID LF 6.5

## (undated) DEVICE — HANDLE SUCT REG CAP FLANGETIP SMOOTH YANK

## (undated) DEVICE — CABLE BPLR L12FT FLYING LD DISP

## (undated) DEVICE — TRAY PREP WET SKIN 4 COMPARTMENT 6 SPNG 2 TWL

## (undated) DEVICE — GLOVE ENCR MICRO S87 7.0

## (undated) DEVICE — TIP BOVIE 4" MEGADYNE

## (undated) DEVICE — MARKER SKIN 2 TIP

## (undated) DEVICE — ELECTRODE ES 2.75IN PTFE BLDE MOD E-Z CLN

## (undated) DEVICE — SYSTEM SEMI RIG LNR 3000CC W/ EL AND SELF

## (undated) DEVICE — SUTURE. PROL SZ 5-0 L36IN NONABSORBABLE BLU .

## (undated) DEVICE — DRAPE SRG 50X36IN HD TRBN STRL

## (undated) DEVICE — SOLUTION PREP 30ML 5% POVIDONE IOD EYE BDINE

## (undated) DEVICE — SOLUTION IRRIG 1000ML 0.9% NACL USP BTL

## (undated) DEVICE — PACK CDS HEAD

## (undated) DEVICE — SYRINGE MED 10ML LL CTRL W/ FNGR GRP CLR BRL

## (undated) NOTE — LETTER
[facility]  Administration of Anesthesia  I, NORMAN Butler agree to be cared for by a physician anesthesiologist alone and/or with a nurse anesthetist, who is specially trained to monitor me and give me medicine to put me to sleep or keep me comfortable during my procedure    I understand that my anesthesiologist and/or anesthetist is not an employee or agent of [facility] or [employer]. He or she works for [facility]    As the patient asking for anesthesia services, I agree to:  Allow the anesthesiologist (anesthesia doctor) to give me medicine and do additional procedures as necessary. Some examples are: Starting or using an “IV” to give me medicine, fluids or blood during my procedure, and having a breathing tube placed to help me breathe when I’m asleep (intubation). In the event that my heart stops working properly, I understand that my anesthesiologist will make every effort to sustain my life, unless otherwise directed by [facility] Do Not Resuscitate documents.  Tell my anesthesia doctor before my procedure:  If I am pregnant.  The last time that I ate or drank.  iii. All of the medicines I take (including prescriptions, herbal supplements, and pills I can buy without a prescription (including street drugs/illegal medications). Failure to inform my anesthesiologist about these medicines may increase my risk of anesthetic complications.  iv.If I am allergic to anything or have had a reaction to anesthesia before.  I understand how the anesthesia medicine will help me (benefits).  I understand that with any type of anesthesia medicine there are risks:  The most common risks are: nausea, vomiting, sore throat, muscle soreness, damage to my eyes, mouth, or teeth (from breathing tube placement).  Rare risks include: remembering what happened during my procedure, allergic reactions to medications, injury to my airway, heart, lungs, vision, nerves, or  muscles and in extremely rare instances death.  My doctor has explained to me other choices available to me for my care (alternatives).  Pregnant Patients (“epidural”):  I understand that the risks of having an epidural (medicine given into my back to help control pain during labor), include itching, low blood pressure, difficulty urinating, headache or slowing of the baby’s heart. Very rare risks include infection, bleeding, seizure, irregular heart rhythms and nerve injury.  Regional Anesthesia (“spinal”, “epidural”, & “nerve blocks”):  I understand that rare but potential complications include headache, bleeding, infection, seizure, irregular heart rhythms, and nerve injury.    _____________________________________________________________________________  Patient (or Representative) Signature/Relationship to Patient  Date   Time    _____________________________________________________________________________   Name (if used)    Language/Organization   Time    _____________________________________________________________________________  Nurse Anesthetist Signature     Date   Time  _____________________________________________________________________________  Anesthesiologist Signature     Date   Time  I have discussed the procedure and information above with the patient (or patient’s representative) and answered their questions. The patient or their representative has agreed to have anesthesia services.    _____________________________________________________________________________  Witness        Date   Time  I have verified that the signature is that of the patient or patient’s representative, and that it was signed before the procedure  Patient Name: NORMAN Butler     : 9/15/1985                 Printed: 2024 at 7:50 PM    Medical Record #: [id number]                                            Page 1 of 1  ----------ANESTHESIA CONSENT----------

## (undated) NOTE — LETTER
[facility]  [address], [place], [place]  Authorization for Surgical Operation and Procedure                                                                                           I hereby authorize Khalida Giles MD, my physician and his/her assistants (if applicable), which may include medical students, residents, and/or fellows, to perform the following surgical operation/ procedure and administer such anesthesia as may be determined necessary by my physician: Operation/Procedure name (s) REPAIR OF LOWER LIP LACERATION on NORMAN Saavedraashely   2.   I recognize that during the surgical operation/procedure, unforeseen conditions may necessitate additional or different procedures than those listed above.  I, therefore, further authorize and request that the above-named surgeon, assistants, or designees perform such procedures as are, in their judgment, necessary and desirable.    3.   My surgeon/physician has discussed prior to my surgery the potential benefits, risks and side effects of this procedure; the likelihood of achieving goals; and potential problems that might occur during recuperation.  They also discussed reasonable alternatives to the procedure, including risks, benefits, and side effects related to the alternatives and risks related to not receiving this procedure.  I have had all my questions answered and I acknowledge that no guarantee has been made as to the result that may be obtained.    4.   Should the need arise during my operation/procedure, which includes change of level of care prior to discharge, I also consent to the administration of blood and/or blood products.  Further, I understand that despite careful testing and screening of blood or blood products by collecting agencies, I may still be subject to ill effects as a result of receiving a blood transfusion and/or blood products.  The following are some, but not all, of the potential risks that can occur: fever  and allergic reactions, hemolytic reactions, transmission of diseases such as Hepatitis, AIDS and Cytomegalovirus (CMV) and fluid overload.  In the event that I wish to have an autologous transfusion of my own blood, or a directed donor transfusion, I will discuss this with my physician.  Check only if Refusing Blood or Blood Products  I understand refusal of blood or blood products as deemed necessary by my physician may have serious consequences to my condition to include possible death. I hereby assume responsibility for my refusal and release the hospital, its personnel, and my physicians from any responsibility for the consequences of my refusal.    o  Refuse   5.   I authorize the use of any specimen, organs, tissues, body parts or foreign objects that may be removed from my body during the operation/procedure for diagnosis, research or teaching purposes and their subsequent disposal by hospital authorities.  I also authorize the release of specimen test results and/or written reports to my treating physician on the hospital medical staff or other referring or consulting physicians involved in my care, at the discretion of the Pathologist or my treating physician.    6.   I consent to the photographing or videotaping of the operations or procedures to be performed, including appropriate portions of my body for medical, scientific, or educational purposes, provided my identity is not revealed by the pictures or by descriptive texts accompanying them.  If the procedure has been photographed/videotaped, the surgeon will obtain the original picture, image, videotape or CD.  The hospital will not be responsible for storage, release or maintenance of the picture, image, tape or CD.    7.   I consent to the presence of a  or observers in the operating room as deemed necessary by my physician or their designees.    8.   I recognize that in the event my procedure results in extended X-Ray/fluoroscopy  time, I may develop a skin reaction.    9. If I have a Do Not Attempt Resuscitation (DNAR) order in place, that status will be suspended while in the operating room, procedural suite, and during the recovery period unless otherwise explicitly stated by me (or a person authorized to consent on my behalf). The surgeon or my attending physician will determine when the applicable recovery period ends for purposes of reinstating the DNAR order.  10. Patients having a sterilization procedure: I understand that if the procedure is successful the results will be permanent and it will therefore be impossible for me to inseminate, conceive, or bear children.  I also understand that the procedure is intended to result in sterility, although the result has not been guaranteed.   11. I acknowledge that my physician has explained sedation/analgesia administration to me including the risk and benefits I consent to the administration of sedation/analgesia as may be necessary or desirable in the judgment of my physician.    I CERTIFY THAT I HAVE READ AND FULLY UNDERSTAND THE ABOVE CONSENT TO OPERATION and/or OTHER PROCEDURE.     _________________________________________ _________________________________     ___________________________________  Signature of Patient     Signature of Responsible Person                   Printed Name of Responsible Person                              _________________________________________ ______________________________        ___________________________________  Signature of Witness         Date  Time         Relationship to Patient    STATEMENT OF PHYSICIAN My signature below affirms that prior to the time of the procedure; I have explained to the patient and/or his/her legal representative, the risks and benefits involved in the proposed treatment and any reasonable alternative to the proposed treatment. I have also explained the risks and benefits involved in refusal of the proposed treatment and  alternatives to the proposed treatment and have answered the patient's questions. If I have a significant financial interest in a co-management agreement or a significant financial interest in any product or implant, or other significant relationship used in this procedure/surgery, I have disclosed this and had a discussion with my patient.     _______________________________________________________________ _____________________________  (Signature of Physician)                                                                                         (Date)                                   (Time)  Patient Name: FREDIARLIN BEAR Butler    : 9/15/1985   Printed: 2024      Medical Record #: [id number]                                              Page 1 of